# Patient Record
Sex: FEMALE | Race: OTHER
[De-identification: names, ages, dates, MRNs, and addresses within clinical notes are randomized per-mention and may not be internally consistent; named-entity substitution may affect disease eponyms.]

---

## 2017-09-29 ENCOUNTER — HOSPITAL ENCOUNTER (EMERGENCY)
Dept: HOSPITAL 62 - ER | Age: 27
Discharge: HOME | End: 2017-09-29
Payer: SELF-PAY

## 2017-09-29 VITALS — DIASTOLIC BLOOD PRESSURE: 65 MMHG | SYSTOLIC BLOOD PRESSURE: 116 MMHG

## 2017-09-29 DIAGNOSIS — N93.8: Primary | ICD-10-CM

## 2017-09-29 LAB
BASOPHILS # BLD AUTO: 0.1 10^3/UL (ref 0–0.2)
BASOPHILS NFR BLD AUTO: 0.8 % (ref 0–2)
EOSINOPHIL # BLD AUTO: 0.4 10^3/UL (ref 0–0.6)
EOSINOPHIL NFR BLD AUTO: 3.3 % (ref 0–6)
ERYTHROCYTE [DISTWIDTH] IN BLOOD BY AUTOMATED COUNT: 13.2 % (ref 11.5–14)
HCT VFR BLD CALC: 39.9 % (ref 36–47)
HGB BLD-MCNC: 13.8 G/DL (ref 12–15.5)
HGB HCT DIFFERENCE: 1.5
LYMPHOCYTES # BLD AUTO: 4.8 10^3/UL (ref 0.5–4.7)
LYMPHOCYTES NFR BLD AUTO: 37.7 % (ref 13–45)
MCH RBC QN AUTO: 26.9 PG (ref 27–33.4)
MCHC RBC AUTO-ENTMCNC: 34.6 G/DL (ref 32–36)
MCV RBC AUTO: 78 FL (ref 80–97)
MONOCYTES # BLD AUTO: 0.7 10^3/UL (ref 0.1–1.4)
MONOCYTES NFR BLD AUTO: 5.7 % (ref 3–13)
NEUTROPHILS # BLD AUTO: 6.7 10^3/UL (ref 1.7–8.2)
NEUTS SEG NFR BLD AUTO: 52.5 % (ref 42–78)
RBC # BLD AUTO: 5.14 10^6/UL (ref 3.72–5.28)
WBC # BLD AUTO: 12.7 10^3/UL (ref 4–10.5)

## 2017-09-29 PROCEDURE — 84703 CHORIONIC GONADOTROPIN ASSAY: CPT

## 2017-09-29 PROCEDURE — 85025 COMPLETE CBC W/AUTO DIFF WBC: CPT

## 2017-09-29 PROCEDURE — 36415 COLL VENOUS BLD VENIPUNCTURE: CPT

## 2017-09-29 PROCEDURE — 99284 EMERGENCY DEPT VISIT MOD MDM: CPT

## 2017-09-29 NOTE — ER DOCUMENT REPORT
ED General





- General


Chief Complaint: Vaginal Bleeding


Stated Complaint: VAGINAL BLEEDING


Time Seen by Provider: 09/29/17 20:53


Notes: 





Patient is a 27-year-old female without past medical history who presents with 

vaginal bleeding for the past 5 weeks.  Patient states that she has never had a 

menstrual cycle last for this duration of time.  She does not take any form of 

birth control.  She has not seen her OB/GYN regarding today's concerns.  She 

notes that she goes through approximately 3-4 pads daily since onset of the 

bleeding.  Nothing seems to improve or worsen her symptoms.  Does note some 

mild associated lower abdominal cramping that is intermittent in nature and not 

present at this time.  She has not had any fever or constitutional symptoms.


TRAVEL OUTSIDE OF THE [place] IN LAST 30 DAYS: No





- Related Data


Allergies/Adverse Reactions: 


 





No Known Allergies Allergy (Verified 09/29/17 18:35)


 











Past Medical History





- General


Information source: Patient


Last Menstrual Period: 8/7/17





- Social History


Smoking Status: Never Smoker


Frequency of alcohol use: None


Drug Abuse: None


Lives with: Spouse/Significant other


Family History: Reviewed & Not Pertinent


Renal/ Medical History: Denies: Hx Peritoneal Dialysis


Surgical Hx: Negative





Review of Systems





- Review of Systems


Notes: 





Constitutional: Negative for fever.


HENT: Negative for sore throat.


Eyes: Negative for visual changes.


Cardiovascular: Negative for chest pain.


Respiratory: Negative for shortness of breath.


Gastrointestinal: Negative for abdominal pain, vomiting or diarrhea.


Genitourinary: Negative for dysuria.  Positive for vaginal bleeding


Musculoskeletal: Negative for back pain.


Skin: Negative for rash.


Neurological: Negative for headaches, weakness or numbness.





10 point ROS negative except as marked above and in HPI.





Physical Exam





- Vital signs


Vitals: 


 











Temp Pulse Resp BP Pulse Ox


 


 97.5 F   72   18   116/65   96 


 


 09/29/17 23:12  09/29/17 23:12  09/29/17 23:12  09/29/17 23:12  09/29/17 23:12











Interpretation: Normal


Notes: 





PHYSICAL EXAMINATION:





GENERAL: Well-appearing, well-nourished and in no acute distress.





HEAD: Atraumatic, normocephalic.





EYES: Pupils equal round and reactive to light, extraocular movements intact, 

sclera anicteric, conjunctiva are normal.





ENT: nares patent, oropharynx clear without exudates.  Moist mucous membranes.





NECK: Normal range of motion, supple without lymphadenopathy





LUNGS: Breath sounds clear to auscultation bilaterally and equal.  No wheezes 

rales or rhonchi.





HEART: Regular rate and rhythm without murmurs





ABDOMEN: Soft, nontender, normoactive bowel sounds.  No guarding, no rebound.  

No masses appreciated.





EXTREMITIES: Normal range of motion, no pitting or edema.  No cyanosis.





NEUROLOGICAL: No focal neurological deficits. Moves all extremities 

spontaneously and on command.





PSYCH: Normal mood, normal affect.





SKIN: Warm, Dry, normal turgor, no rashes or lesions noted.





Course





- Re-evaluation


Re-evalutation: 





09/29/17 22:41


Presentation is most consistent with dysfunctional uterine bleeding and 

otherwise well-appearing patient.  Her hemoglobin was within normal limits.  

She is not pregnant.  No tachycardia or hypotension. Examination is otherwise 

unremarkable.  She denies bleeding through more than 2 pads an hour at any 

point in time.  No indication for ultrasound at this time based on benign exam, 

vitals and laboratories.  No active bleeding at this time per patient report.  

Patient will be started on oral birth control pills to help discontinue the 

bleeding.  She has been encouraged to follow-up with OB/GYN.  Return 

precautions have been discussed.











- Vital Signs


Vital signs: 


 











Temp Pulse Resp BP Pulse Ox


 


 97.5 F   72   18   116/65   96 


 


 09/29/17 23:12  09/29/17 23:12  09/29/17 23:12  09/29/17 23:12  09/29/17 23:12














- Laboratory


Result Diagrams: 


 09/29/17 21:10





Laboratory results interpreted by me: 


 











  09/29/17





  21:10


 


WBC  12.7 H


 


MCV  78 L


 


MCH  26.9 L


 


Absolute Lymphocytes  4.8 H














Discharge





- Discharge


Clinical Impression: 


 Dysfunctional uterine bleeding





Condition: Good


Disposition: HOME, SELF-CARE


Additional Instructions: 


You were seen today for dysfunctional uterine bleeding.  This is when you have 

vaginal bleeding and abdominal cramping off of your normal menstrual cycle.  

You have been started on birth control pills to help regulate your cycle and 

control your symptoms.  You need to follow-up with OB/GYN or your primary care 

physician the next 1-3 days.  Return immediately if you worsening pain, you 

began bleeding through more than 2 pads per hour for more than 3 hours, you 

pass out, have persistent vomiting, develop a fever greater than 100.4F, or 

any other symptoms that are concerning to you.


Prescriptions: 


Norgestimate-Ethinyl Estradiol [Sprintec 28 Day Tablet] 1 each PO DAILY #2 

packet

## 2019-06-25 ENCOUNTER — HOSPITAL ENCOUNTER (EMERGENCY)
Dept: HOSPITAL 62 - ER | Age: 29
Discharge: HOME | End: 2019-06-25
Payer: COMMERCIAL

## 2019-06-25 VITALS — DIASTOLIC BLOOD PRESSURE: 60 MMHG | SYSTOLIC BLOOD PRESSURE: 114 MMHG

## 2019-06-25 DIAGNOSIS — L03.114: Primary | ICD-10-CM

## 2019-06-25 PROCEDURE — 99283 EMERGENCY DEPT VISIT LOW MDM: CPT

## 2019-06-25 NOTE — ER DOCUMENT REPORT
ED General





- General


Chief Complaint: Abrasion(s)


Stated Complaint: GROIN PAIN


Time Seen by Provider: 06/25/19 23:22


Primary Care Provider: 


ANTOINETTE MARQUEZ MD [ACTIVE STAFF] - 06/27/19


Notes: 





Patient is a pleasant 20-year-old female presents with complaint of a cut to her

vaginal area with some swelling to the vaginal area.  She initially the cut 

shaving.  She has some pain down the area now.  No abnormal discharge.  No 

fevers.  No vomiting.  No other complaints at this time.


TRAVEL OUTSIDE OF THE [place] IN LAST 30 DAYS: No





- Related Data


Allergies/Adverse Reactions: 


                                        





No Known Allergies Allergy (Verified 09/29/17 18:35)


   











Past Medical History





- Social History


Smoking Status: Never Smoker


Frequency of alcohol use: None


Drug Abuse: None


Family History: Reviewed & Not Pertinent


Patient has suicidal ideation: No


Patient has homicidal ideation: No


Renal/ Medical History: Denies: Hx Peritoneal Dialysis





Review of Systems





- Review of Systems


Notes: 





My Normal Review Basic





REVIEW OF SYSTEMS:


CONSTITUTIONAL :  Denies fever,  chills, or sweats.  Denies recent illness.


GENITOURINARY:  Denies difficulty urinating, painful urination, burning, 

frequency, or blood in urine.


FEMALE  GENITOURINARY: Vaginal pain and swelling


MUSCULOSKELETAL:  Denies neck or back pain or joint pain or swelling.


SKIN:   Denies rash or skin lesions.


ALL OTHER SYSTEMS REVIEWED AND NEGATIVE.





Physical Exam





- Vital signs


Vitals: 


                                        











Temp Pulse Resp BP Pulse Ox


 


 98.6 F   75   20   127/60 H  97 


 


 06/25/19 21:34  06/25/19 21:34  06/25/19 21:34  06/25/19 21:34  06/25/19 21:34














- Notes


Notes: 





General Appearance: Well nourished, alert, cooperative, no acute distress, no 

obvious discomfort.  Well-appearing.


Vitals: reviewed, See vital signs table.


Eyes: PERRL, EOMI, Conjuctiva clear


Abdomen: Normal BS, soft, No rigidity, No abdominal tenderness, No guarding, no 

rebound, no abdominal masses, no organomegaly


Exam: Pelvic exam performed with female nurse at bedside.  Patient does have a 

small that is several days old just superior to the clitoral johnson.  Patient does

have a small amount of surrounding erythema and swelling and swelling to the 

vaginal vulva consistent with cellulitis.  There is no fluctuance or evidence of

abscess at this time.  No redness spreading into the remainder of the pelvic 

region.


Skin: warm, dry, appropriate colo


Neuro: speech clear, oriented x 3, normal affect, responds appropriately to 

questions.





Course





- Re-evaluation


Re-evalutation: 





06/25/19 23:35


Patient has a small laceration just superior to the clitoral johnson.  She has some

smaller redness and swelling into the vulvovaginal area.  Is no evidence of 

abscess.  There is no fluctuance.  I feel that outpatient antibiotic's are 

appropriate this time.  Patient looks well and has no signs of sepsis or severe 

infection.  I informed her and her  that they need to have a low 

threshold to return to the ER easily if she has fevers, numbing, spreading 

redness or swelling around the vaginal area, or if she feels she is worse in any

way.  I informed her to follow-up with her gynecologist next 2 to 3 days.  If 

she is unable to get to gynecologist by Friday then she should return to the ER 

for reevaluation.  Patient and  agree with plan and patient will be 

discharged home.





Dictation of this chart was performed using voice recognition software; 

therefore, there may be some unintended grammatical errors.





- Vital Signs


Vital signs: 


                                        











Temp Pulse Resp BP Pulse Ox


 


 97.9 F   69   16   114/60   98 


 


 06/25/19 23:41  06/25/19 23:41  06/25/19 23:41  06/25/19 23:41  06/25/19 23:41














Discharge





- Discharge


Clinical Impression: 


 Cellulitis





Condition: Good


Disposition: HOME, SELF-CARE


Additional Instructions: 


Have what appears to be skin infection of the vaginal area.  I do not see 

evidence of an abscess at this time.  At this time is currently appropriate to 

place on antibiotics however it is extremely important he follow-up closely with

the gynecologist.  Gynecologist on call is Dr. Antoinette Marquez.  Please call her 

office this morning to make close follow-up appointment.  If you are unable to 

follow-up with them by Friday then you should at least return to the ER so we 

can recheck you to make sure that you are healing appropriately.  Please return 

to the ER immediately if you have swelling to the vaginal area, spreading 

redness, worsening pain, fevers, or if you feel that you are worsening in any 

way.


Prescriptions: 


RX: Clindamycin HCl [Cleocin 150 mg Capsule] 300 mg PO Q6 #56 capsule


Referrals: 


ANTOINETTE MARQUEZ MD [ACTIVE STAFF] - 06/27/19